# Patient Record
Sex: MALE | Race: WHITE | NOT HISPANIC OR LATINO | URBAN - NONMETROPOLITAN AREA
[De-identification: names, ages, dates, MRNs, and addresses within clinical notes are randomized per-mention and may not be internally consistent; named-entity substitution may affect disease eponyms.]

---

## 2020-04-27 ENCOUNTER — IMPORTED ENCOUNTER (OUTPATIENT)
Dept: URBAN - NONMETROPOLITAN AREA CLINIC 1 | Facility: CLINIC | Age: 80
End: 2020-04-27

## 2020-04-27 PROBLEM — H31.091: Noted: 2020-04-27

## 2020-04-27 PROBLEM — H35.373: Noted: 2020-04-27

## 2020-04-27 PROBLEM — H53.2: Noted: 2020-04-27

## 2020-04-27 PROBLEM — Z96.1: Noted: 2020-04-27

## 2020-04-27 PROBLEM — H52.4: Noted: 2020-04-27

## 2020-04-27 PROBLEM — E11.9: Noted: 2018-02-08

## 2020-04-27 PROBLEM — H43.813: Noted: 2020-04-27

## 2020-04-27 PROCEDURE — 92015 DETERMINE REFRACTIVE STATE: CPT

## 2020-04-27 PROCEDURE — 92014 COMPRE OPH EXAM EST PT 1/>: CPT

## 2021-05-10 ENCOUNTER — IMPORTED ENCOUNTER (OUTPATIENT)
Dept: URBAN - NONMETROPOLITAN AREA CLINIC 1 | Facility: CLINIC | Age: 81
End: 2021-05-10

## 2021-05-10 PROBLEM — H43.813: Noted: 2021-05-10

## 2021-05-10 PROBLEM — Z96.1: Noted: 2021-05-10

## 2021-05-10 PROBLEM — H40.1113: Noted: 2021-05-10

## 2021-05-10 PROBLEM — H40.012: Noted: 2021-05-10

## 2021-05-10 PROBLEM — H31.091: Noted: 2021-05-10

## 2021-05-10 PROBLEM — E11.9: Noted: 2021-05-10

## 2021-05-10 PROBLEM — H35.373: Noted: 2021-05-10

## 2021-05-10 PROCEDURE — 92014 COMPRE OPH EXAM EST PT 1/>: CPT

## 2021-05-10 PROCEDURE — 92083 EXTENDED VISUAL FIELD XM: CPT

## 2021-05-10 NOTE — PATIENT DISCUSSION
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 -Explained the possible effects of poorly controlled diabetes and the damage that diabetes can cause to ocular health. -Patient to check HgbA1C.-Pt instructed to contact our office with any vision changes. PC IOL OUs/p yag OU stable continue to monitor; Dr's Notes: MR 4/27/2020DFE 4/27/2020OCT MAC 4/27/2020

## 2022-03-18 PROBLEM — R09.02 HYPOXIA: Status: ACTIVE | Noted: 2021-05-23

## 2022-03-18 PROBLEM — I50.43 ACUTE ON CHRONIC COMBINED SYSTOLIC AND DIASTOLIC CHF (CONGESTIVE HEART FAILURE) (HCC): Status: ACTIVE | Noted: 2021-06-28

## 2022-03-18 PROBLEM — J44.1 ACUTE EXACERBATION OF CHRONIC OBSTRUCTIVE PULMONARY DISEASE (COPD) (HCC): Status: ACTIVE | Noted: 2021-05-23

## 2022-03-18 PROBLEM — I20.8 ANGINA OF EFFORT (HCC): Status: ACTIVE | Noted: 2021-06-28

## 2022-03-19 PROBLEM — I20.8 ANGINAL EQUIVALENT (HCC): Status: ACTIVE | Noted: 2021-06-28

## 2022-03-19 PROBLEM — R06.09 EXERTIONAL DYSPNEA: Status: ACTIVE | Noted: 2021-06-28

## 2022-03-19 PROBLEM — Z86.79 HISTORY OF CORONARY ARTERY DISEASE: Status: ACTIVE | Noted: 2021-06-28

## 2022-03-20 PROBLEM — R60.9 PERIPHERAL EDEMA: Status: ACTIVE | Noted: 2021-06-28

## 2022-04-09 ASSESSMENT — VISUAL ACUITY
OD_CC: 20/40+2
OU_CC: 20/30-1
OS_CC: 20/25+2
OD_CC: 20/60-2
OS_CC: 20/25-2

## 2022-04-09 ASSESSMENT — TONOMETRY
OS_IOP_MMHG: 21
OD_IOP_MMHG: 21
OS_IOP_MMHG: 20
OD_IOP_MMHG: 21

## 2022-12-12 PROBLEM — I50.9 HEART FAILURE (HCC): Status: ACTIVE | Noted: 2022-12-12

## 2022-12-12 PROBLEM — B33.8 RSV INFECTION: Status: ACTIVE | Noted: 2022-12-12

## 2022-12-12 PROBLEM — J44.1 COPD EXACERBATION (HCC): Status: ACTIVE | Noted: 2022-12-12

## 2024-06-25 ENCOUNTER — NEW PATIENT (OUTPATIENT)
Facility: LOCATION | Age: 84
End: 2024-06-25

## 2024-06-25 DIAGNOSIS — H53.2: ICD-10-CM

## 2024-06-25 PROCEDURE — 99205 OFFICE O/P NEW HI 60 MIN: CPT

## 2024-06-25 PROCEDURE — 99499LVDN LOW VISION DEMO NEW PATIENT

## 2024-06-25 PROCEDURE — 92015 DETERMINE REFRACTIVE STATE: CPT

## 2024-06-25 ASSESSMENT — VISUAL ACUITY
OS_SC: 20/30-1
OD_SC: 20/50-1
OD_SC: 20/70
OS_SC: 20/70
